# Patient Record
Sex: FEMALE | URBAN - METROPOLITAN AREA
[De-identification: names, ages, dates, MRNs, and addresses within clinical notes are randomized per-mention and may not be internally consistent; named-entity substitution may affect disease eponyms.]

---

## 2019-08-22 ENCOUNTER — APPOINTMENT (RX ONLY)
Dept: URBAN - METROPOLITAN AREA CLINIC 137 | Facility: CLINIC | Age: 29
Setting detail: DERMATOLOGY
End: 2019-08-22

## 2019-08-22 DIAGNOSIS — Z41.9 ENCOUNTER FOR PROCEDURE FOR PURPOSES OTHER THAN REMEDYING HEALTH STATE, UNSPECIFIED: ICD-10-CM

## 2019-08-22 PROCEDURE — ? FILLERS

## 2019-08-22 NOTE — PROCEDURE: FILLERS
[place] Filler Volume In Cc: 0
Consent: Written consent obtained. Risks include but not limited to bruising, beading, irregular texture, ulceration, infection, allergic reaction, scar formation, incomplete augmentation, temporary nature, procedural pain.
Lot #: [id number]
Nasolabial Folds Filler Volume In Cc: 0.2
Include Cannula Information In Note?: No
Post-Care Instructions: Patient instructed to apply ice to reduce swelling.
Expiration Date (Month Year): 08/31/2019
Lot #: [id number]
Expiration Date (Month Year): 12/10/2019
Lot #: [id number]
Additional Area 3 Location: Eyebrows
Price (Use Numbers Only, No Special Characters Or $): 550.00
Topical Anesthesia?: 23% lidocaine, 7% tetracaine
Additional Area 2 Location: Chin
Additional Area 3 Location: Forehead
Include Cannula Size?: 22G
Map Statment: See Attach Map for Complete Details
Additional Area 4 Location: Cutanaeous Upper Lip
Vermilion Lips Filler Volume In Cc: 0.8
Expiration Date (Month Year): 5/19/2020
Filler: Versa
Additional Area 1 Location: Cutaneous Lips
Additional Area 2 Location: Glabella & Bridge of Nose
Include Cannula Information In Note?: Yes
Detail Level: Detailed